# Patient Record
Sex: FEMALE | Race: WHITE | Employment: FULL TIME | URBAN - METROPOLITAN AREA
[De-identification: names, ages, dates, MRNs, and addresses within clinical notes are randomized per-mention and may not be internally consistent; named-entity substitution may affect disease eponyms.]

---

## 2021-03-30 ENCOUNTER — IMMUNIZATION (OUTPATIENT)
Dept: PRIMARY CARE CLINIC | Age: 51
End: 2021-03-30
Payer: COMMERCIAL

## 2021-03-30 PROCEDURE — 0001A PR IMM ADMN SARSCOV2 30MCG/0.3ML DIL RECON 1ST DOSE: CPT | Performed by: FAMILY MEDICINE

## 2021-03-30 PROCEDURE — 91300 COVID-19, PFIZER VACCINE 30MCG/0.3ML DOSE: CPT | Performed by: FAMILY MEDICINE

## 2021-04-20 ENCOUNTER — IMMUNIZATION (OUTPATIENT)
Dept: PRIMARY CARE CLINIC | Age: 51
End: 2021-04-20
Payer: COMMERCIAL

## 2021-04-20 PROCEDURE — 0002A COVID-19, PFIZER VACCINE 30MCG/0.3ML DOSE: CPT | Performed by: FAMILY MEDICINE

## 2021-04-20 PROCEDURE — 91300 COVID-19, PFIZER VACCINE 30MCG/0.3ML DOSE: CPT | Performed by: FAMILY MEDICINE

## 2022-03-28 ENCOUNTER — OFFICE VISIT (OUTPATIENT)
Dept: ORTHOPEDIC SURGERY | Age: 52
End: 2022-03-28
Payer: COMMERCIAL

## 2022-03-28 VITALS — HEIGHT: 68 IN | BODY MASS INDEX: 20.46 KG/M2 | WEIGHT: 135 LBS

## 2022-03-28 DIAGNOSIS — S80.02XA CONTUSION OF LEFT KNEE, INITIAL ENCOUNTER: ICD-10-CM

## 2022-03-28 DIAGNOSIS — M70.42 PREPATELLAR BURSITIS OF LEFT KNEE: ICD-10-CM

## 2022-03-28 DIAGNOSIS — M25.562 ACUTE PAIN OF LEFT KNEE: Primary | ICD-10-CM

## 2022-03-28 PROCEDURE — 99203 OFFICE O/P NEW LOW 30 MIN: CPT | Performed by: FAMILY MEDICINE

## 2022-03-28 PROCEDURE — L1812 KO ELASTIC W/JOINTS PRE OTS: HCPCS | Performed by: FAMILY MEDICINE

## 2022-03-28 RX ORDER — DICLOFENAC SODIUM 75 MG/1
75 TABLET, DELAYED RELEASE ORAL 2 TIMES DAILY
Qty: 60 TABLET | Refills: 3 | Status: SHIPPED | OUTPATIENT
Start: 2022-03-28

## 2022-03-28 NOTE — PROGRESS NOTES
Chief Complaint  Knee Pain (N L KNEE/ SHEY PARENT)      Initial consultation left knee pain status post fall 3/21/2022 while on vacation in [place]    History of Present Illness:  Adelaida Roche is a 49 y.o. female who is a very pleasant white female  for [employer] who is a very nice patient of Dr. Stephanie Arcehiga who is being seen today upon referral from Magno Tello at [facility] for evaluation of an acute injury to her left knee. She states that while in [place] on vacation at [employer] she was walking in a restaurant when another patron inadvertently extended her leg causing her to fall directly onto the anterior portion of her left greater than right knee. This was on a concrete tile surface and fell directly onto the anterior portion of her left knee. There was no pop or crack or sensation of shifting but she did have immediate pain. There is very focal swelling over what sounds like the prepatellar bursa almost immediately after the injury and then subsequently her whole knee swelled the following day. She did ice and took ibuprofen but did not go to the emergency room in [place]. She has been icing and has been having difficulty with positional changes going up and down stairs and is being very cautious. She really does not have a history of knee issues in the past, she has not had imaging. She has had a few episodes of subtle buckling but no active locking or catching. Her pain symptoms have improved about 40% with ice and relative rest as well as ibuprofen over the last week but did contact Lonza Ill at [facility] who asked that we see her today for orthopedic and sports consultation with initial imaging.       Pain Assessment  Location of Pain: Knee  Location Modifiers: Left  Severity of Pain: 2  Quality of Pain: Dull,Aching,Sharp  Duration of Pain: Persistent  Frequency of Pain: Constant  Aggravating Factors: Walking,Standing,Stairs,Squatting,Kneeling  Limiting Behavior: Yes  Relieving Factors: Rest  Result of Injury: Yes  Work-Related Injury: No  Are there other pain locations you wish to document?: No         Medical History     Patient's medications, allergies, past medical, surgical, social and family histories were reviewed and updated as appropriate. Review of Systems  Pertinent items are noted in HPI  Review of systems reviewed from Patient History Form dated on 3/28/2022 and available in the patient's chart under the Media tab. Vital Signs  There were no vitals filed for this visit. General Exam:     Constitutional: Patient is adequately groomed with no evidence of malnutrition  DTRs: Deep tendon reflexes are intact  Mental Status: The patient is oriented to time, place and person. The patient's mood and affect are appropriate. Lymphatic: The lymphatic examination bilaterally reveals all areas to be without enlargement or induration. Vascular: Examination reveals no swelling or calf tenderness. Peripheral pulses are palpable and 2+. Neurological: The patient has good coordination. There is no weakness or sensory deficit. Knee Examination  Inspection: There is no high-grade deformity although she does have some thickening and mild swelling over the prepatellar bursa as well as at best only trace knee joint effusion. Palpation: She does have tenderness over the medial and lateral patellofemoral facet and some tenderness over the residual swelling in her prepatellar bursa. Thankfully she does not exhibit much in the way of joint line tenderness. Rang of Motion: She is stiff and guarded in the terminal 10 degrees of extension and flexion is to about 110 today. Strength: Her extensor mechanism appears to be intact with strength being 4 out of 5 with flexion extension.     Special Tests: She does have some discomfort with direct palpation of the prepatellar bursa and some discomfort with patellar grind testing. Her Ward's testing instability testing appear to be relatively benign currently. She is mildly guarded. Skin: There are no rashes, ulcerations or lesions. Distal neurovascular exam is intact. Gait: Reasonable gait although she does have some anterior discomfort with positional change. Reflex symmetrically preserved    Additional Comments:     Additional Examinations:  Contralateral Exam: Examination of the right knee reveals intact skin. There is no focal tenderness. The patient demonstrates full painless range of motion with regards to flexion and extension. Strength is 5/5 thorough out all planes. Ligamentous stability is grossly intact. Right Lower Extremity: Examination of the right lower extremity does not show any tenderness, deformity or injury. Range of motion is unremarkable. There is no gross instability. There are no rashes, ulcerations or lesions. Strength and tone are normal.  Left Lower Extremity: Examination of the left lower extremity does not show any tenderness, deformity or injury. Range of motion is unremarkable. There is no gross instability. There are no rashes, ulcerations or lesions. Strength and tone are normal.      Diagnostic Test Findings: Left knee AP and PA weightbearing sunrise and lateral films were obtained today and does not show evidence of obvious displaced fracture or high-grade degenerative changes. Mild prepatellar swelling noted. Assessment : #1.   1 week status post fall with left anterior knee contusion with posttraumatic prepatellar bursitis and knee pain (partially improved)    Impression:  Encounter Diagnoses   Name Primary?     Acute pain of left knee Yes    Contusion of left knee, initial encounter     Prepatellar bursitis of left knee        Office Procedures:  Orders Placed This Encounter   Procedures    XR KNEE LEFT (MIN 4 VIEWS)     Standing Status:   Future     Number of Occurrences:   1     Standing Expiration Date:   3/28/2023     Order Specific Question:   Reason for exam:     Answer:   pain    Ambulatory referral to Physical Therapy     Referral Priority:   Routine     Referral Type:   Eval and Treat     Referral Reason:   Specialty Services Required     Requested Specialty:   Physical Therapy     Number of Visits Requested:   1    Breg Economy Hinged Knee WrapAround Brace     Patient was prescribed a Breg Economy Hinged Wrap Around Knee Brace. The left knee will require stabilization / immobilization from this semi-rigid / rigid orthosis to improve their function. The orthosis will assist in protecting the affected area, provide functional support and facilitate healing. The patient was educated and fit by a healthcare professional with expert knowledge and specialization in brace application while under the direct supervision of the treating physician. Verbal and written instructions for the use of and application of this item were provided. They were instructed to contact the office immediately should the brace result in increased pain, decreased sensation, increased swelling or worsening of the condition. Treatment Plan:  Treatment options were discussed with Myles Wood. We did review her current plain films and exam findings. She is now about 1 week out from her fall on 3/21/2022 in [place]. I suspect her dealing with a contusion with posttraumatic prepatellar bursitis but her plain films did not suggest high-grade degenerative changes. She could have some reactive chondromalacia patella as well. We did place her in a wraparound knee brace to give her some support we will start her in a short course of therapy possibly to include ultrasound from her prepatellar bursa. We started her on diclofenac 75 mg 1 pill twice daily we will see her back in a few weeks. We will consider imaging and/or injection should she remain symptomatic.   She will contact us in the interim with questions or concerns and icing and activity modification was discussed. This dictation was performed with a verbal recognition program (DRAGON) and it was checked for errors. It is possible that there are still dictated errors within this office note. If so, please bring any errors to my attention for an addendum. All efforts were made to ensure that this office note is accurate.

## 2022-04-01 ENCOUNTER — HOSPITAL ENCOUNTER (OUTPATIENT)
Dept: PHYSICAL THERAPY | Age: 52
Setting detail: THERAPIES SERIES
Discharge: HOME | End: 2022-04-01
Payer: COMMERCIAL

## 2022-04-01 PROCEDURE — 97140 MANUAL THERAPY 1/> REGIONS: CPT | Performed by: PHYSICAL THERAPIST

## 2022-04-01 PROCEDURE — 97110 THERAPEUTIC EXERCISES: CPT | Performed by: PHYSICAL THERAPIST

## 2022-04-01 PROCEDURE — 97161 PT EVAL LOW COMPLEX 20 MIN: CPT | Performed by: PHYSICAL THERAPIST

## 2022-04-01 NOTE — FLOWSHEET NOTE
[facility], [address] [place] [address]  Phone: [phone]  Fax [phone]    Physical Therapy Treatment Note/ Progress Report:           Date:  2022    Patient Name:  Minerva Nissen    :  1970  MRN: [id number]  Restrictions/Precautions:    Medical/Treatment Diagnosis Information:  Diagnosis: M25.562 (ICD-10-CM) - Acute pain of left knee; S80.02XA (ICD-10-CM) - Contusion of left knee, initial encounter; M70.42 (ICD-10-CM) - Prepatellar bursitis of left knee  Treatment Diagnosis: M25.562 (ICD-10-CM) - Acute pain of left knee; difficulty walking G85.7  Insurance/Certification information:  PT Insurance Information: [facility]  Physician Information:  Referring Practitioner: Dr. Sai Stewart  Has the plan of care been signed (Y/N):        []  Yes  [x]  No     Date of Patient follow up with Physician: 22      Is this a Progress Report:     []  Yes  [x]  No        If Yes:  Date Range for reporting period:  Beginning 22  Ending    Progress report will be due (10 Rx or 30 days whichever is less): 3/9/23       Recertification will be due (POC Duration  / 90 days whichever is less): 4-6 week POC  22     Visit # Insurance Allowable Auth Required   In-person 1 Check auth []  Yes []  No    Telehealth   []  Yes []  No    Total          Functional Scale: LEFS 48%    Date assessed:  22      Therapy Diagnosis/Practice Pattern:E      Number of Comorbidities:  [x]0     []1-2    []3+    Latex Allergy:  [x]NO      []YES  Preferred Language for Healthcare:   [x]English       []other:      Pain level:  2-4/10     SUBJECTIVE:  See eval    OBJECTIVE: See eval   Observation:    Test measurements:      RESTRICTIONS/PRECAUTIONS: None    Exercises/Interventions:     Therapeutic Ex (53374) Sets/sec Reps Notes/CUES         Gs belt stretch  30\"  3 x  HEP   HS long sitting stretch 30\"  3 x  HEP   Quad set into towel roll 5\" 10 x  HEP   Seated heel slide with belt 5\"  10 x  HEP   SLR  10 x  HEP; avoid QS each time due to pain    Prone tke  nv    Std Tke  nv                 Ultrasound 50%, 1.2 w/cm2  X 5 min           Manual Intervention (79626)      Patellar mobs  X 2 min  Superior / inf   PROM, manual GS/ HS stretch   X 4 min                             NMR re-education (98392)   CUES NEEDED                                                         Therapeutic Activity (41076)                                          Access Code: [email or web address]  URL: [email or web address]  Date: 04/01/2022  Prepared by: Annita Macdonald     Exercises  Long Sitting Calf Stretch with Strap - 2 x daily - 7 x weekly - 4 reps - 20 hold  Seated Table Hamstring Stretch - 2 x daily - 7 x weekly - 4 reps - 20 hold  Long Sitting Quad Set - 2 x daily - 7 x weekly - 10 reps - 5-10 hold  Sitting Heel Slide with Towel - 2 x daily - 7 x weekly - 10 reps - 5 hold  Small Range Straight Leg Raise - 2 x daily - 7 x weekly - 2 sets - 10 reps       Therapeutic Exercise and NMR EXR  [x] (51178) Provided verbal/tactile cueing for activities related to strengthening, flexibility, endurance, ROM for improvements in LE, proximal hip, and core control with self care, mobility, lifting, ambulation.  [] (47496) Provided verbal/tactile cueing for activities related to improving balance, coordination, kinesthetic sense, posture, motor skill, proprioception  to assist with LE, proximal hip, and core control in self care, mobility, lifting, ambulation and eccentric single leg control.      NMR and Therapeutic Activities:    [] (51384 or 75160) Provided verbal/tactile cueing for activities related to improving balance, coordination, kinesthetic sense, posture, motor skill, proprioception and motor activation to allow for proper function of core, proximal hip and LE with self care and ADLs  [] (56083) Gait Re-education- Provided training and instruction to the patient for proper LE, core and proximal hip recruitment and positioning and eccentric body weight control with ambulation re-education including up and down stairs     Home Exercise Program:    [x] (32987) Reviewed/Progressed HEP activities related to strengthening, flexibility, endurance, ROM of core, proximal hip and LE for functional self-care, mobility, lifting and ambulation/stair navigation   [] (40289)Reviewed/Progressed HEP activities related to improving balance, coordination, kinesthetic sense, posture, motor skill, proprioception of core, proximal hip and LE for self care, mobility, lifting, and ambulation/stair navigation      Manual Treatments:  PROM / STM / Oscillations-Mobs:  G-I, II, III, IV (PA's, Inf., Post.)  [x] (31117) Provided manual therapy to mobilize LE, proximal hip and/or LS spine soft tissue/joints for the purpose of modulating pain, promoting relaxation,  increasing ROM, reducing/eliminating soft tissue swelling/inflammation/restriction, improving soft tissue extensibility and allowing for proper ROM for normal function with self care, mobility, lifting and ambulation. Modalities: Declined - encouraged ice at home    [] GAME READY (VASO)- for significant edema, swelling, pain control. Charges  Timed Code Treatment Minutes: 25   Total Treatment Minutes: 40     [x] EVAL (LOW) 20121   [] EVAL (MOD) 20415  [] EVAL (HIGH) 95165   [] RE-EVAL     [x] WJ(59296) x 1   [] IONTO  [] NMR (53583) x     [] VASO  [x] Manual (53874) x 1     [] Other:  [] TA x      [] Mech Traction (12381)  [] ES(attended) (79720)      [] ES (un) (58668):       GOALS:   Patient stated goal: Return to ADLs  [] Progressing: [] Met: [] Not Met: [] Adjusted    Therapist goals for Patient:   Short Term Goals: To be achieved in: 2 weeks  1. Independent in HEP and progression per patient tolerance, in order to prevent re-injury. [] Progressing: [] Met: [] Not Met: [] Adjusted  2.  Patient will have a decrease in pain to facilitate improvement in movement, function, and ADLs as indicated by Functional Deficits. [] Progressing: [] Met: [] Not Met: [] Adjusted    Long Term Goals: To be achieved in: 4-6 weeks  1. Disability index score of 25% or less for the LEFS to assist with ambulating stairs and walking community. [] Progressing: [] Met: [] Not Met: [] Adjusted  2. Patient will demonstrate increased AROM to 0-140 deg to allow for proper joint functioning Necessary to perform squatting to  objection from floor. [] Progressing: [] Met: [] Not Met: [] Adjusted  3. Patient will demonstrate an increase in Strength to good proximal hip and quad strength and control, 5/5 MMT in LLE to allow for proper function to perform reciprocal gait on stairs. [] Progressing: [] Met: [] Not Met: [] Adjusted  4. Patient will return to ambulating reciprocal gait on 12 stairs necessary to manage her home without increased symptoms or restriction. [] Progressing: [] Met: [] Not Met: [] Adjusted  5. Patient will ambulate community distances with symmetrical gait pattern for 30 minutes to 1 hour. [] Progressing: [] Met: [] Not Met: [] Adjusted     Progression Towards Functional goals:  [] Patient is progressing as expected towards functional goals listed. [] Progression is slowed due to complexities listed. [] Progression has been slowed due to co-morbidities. [x] Plan just implemented, too soon to assess goals progression  [] Other:       Overall Progression Towards Functional goals/ Treatment Progress Update:  [] Patient is progressing as expected towards functional goals listed. [] Progression is slowed due to complexities/Impairments listed. [] Progression has been slowed due to co-morbidities.   [x] Plan just implemented, too soon to assess goals progression <30days   [] Goals require adjustment due to lack of progress  [] Patient is not progressing as expected and requires additional follow up with physician  [] Other    Prognosis for POC: [x] Good [] Fair  [] Poor      Patient requires continued skilled intervention: [x] Yes  [] No    Treatment/Activity Tolerance:  [x] Patient able to complete treatment  [] Patient limited by fatigue  [] Patient limited by pain    [] Patient limited by other medical complications  [] Other:     ASSESSMENT: See Eval        PLAN: See eval  [] Continue per plan of care [] Alter current plan (see comments above)  [x] Plan of care initiated [] Hold pending MD visit [] Discharge      Electronically signed by:  Lorie Maria PT    Note: If patient does not return for scheduled/ recommended follow up visits, this note will serve as a discharge from care along with most recent update on progress.

## 2022-04-01 NOTE — PLAN OF CARE
[facility], [address] [place] [address]  Phone: [phone]  Fax [phone]     Physical Therapy Certification    Dear Referring Practitioner: Dr. Nancy Mcduffie,    We had the pleasure of evaluating the following patient for physical therapy services at [address] [place] [address]. A summary of our findings can be found in the initial assessment below. This includes our plan of care. If you have any questions or concerns regarding these findings, please do not hesitate to contact me at the office phone number checked above.   Thank you for the referral.       Physician Signature:_______________________________Date:__________________  By signing above (or electronic signature), therapists plan is approved by physician    Patient: Lilliam Matthew   : 1970   MRN: [id number]  Referring Physician: Referring Practitioner: Dr. Nancy Mcduffie      Evaluation Date: 2022      Medical Diagnosis Information:  Diagnosis: M25.562 (ICD-10-CM) - Acute pain of left knee; S80.02XA (ICD-10-CM) - Contusion of left knee, initial encounter; M70.42 (ICD-10-CM) - Prepatellar bursitis of left knee   Treatment Diagnosis: M25.562 (ICD-10-CM) - Acute pain of left knee; difficulty walking R26.2                                         Insurance information: PT Insurance Information: BCBS - check Auth       Precautions/ Contra-indications:      C-SSRS Triggered by Intake questionnaire (Past 2 wk assessment):   [x] No, Questionnaire did not trigger screening.   [] Yes, Patient intake triggered further evaluation      [] C-SSRS Screening completed  [] PCP notified via Plan of Care  [] Emergency services notified     Latex Allergy:  [x]NO      []YES  Preferred Language for Healthcare:   [x]English       []other:    SUBJECTIVE: Patient stated complaint:  Patient reports on vacation in [place] when lady stuck foot out at table and tripped over her foot and landed directly on left knee. Difficulty walking but did not get AD or seek treatment while on vacation. Swelling immediately. Upon return home followed up with MD. Lanie Chambers in brace which believes has helped with stability. Scared it is going to buckle. No previous history of knee pathology. Difficulty managing stairs at home, using step to gait. Difficulty bending the knee. No popping, clicking and catching. Pain isolated to anterior knee. Prescribed anti-inflammatory for swelling control. Has not been icing but did initially first week. No issues with sleep. No issues with desk work. Minimal issues with walking as long as stays straight forward avoiding pivoting and guarded. Relevant Medical History: Unremarkable  Functional Disability Index: LEFS 42/80= 48%    Pain Scale: 2-4/10  Easing factors: activity modification, brace, guarded movement/ walking   Provocative factors: squatting, stairs, kneeling    Type: []Constant   [x]Intermittent  []Radiating []Localized []other:     Numbness/Tingling: Denies    Occupation/School:   - desk job    Living Status/Prior Level of Function: Independent with ADLs and IADLs, walking    OBJECTIVE:     ROM LEFT RIGHT   Knee ext Lacking 10 0   Knee Flex 110 140   Strength  LEFT RIGHT   HIP Flexors 4+ 4+   HIP Abductors     HIP Ext     Hip ER     Knee EXT (quad) 4+ 5   Knee Flex (HS) 4+ 5   Circumference  Mid apex  Superior pole     34.5 cm  35.5   33.5cm  35     Reflexes/Sensation: Not formally assessed   []Dermatomes/Myotomes intact    []Reflexes equal and normal bilaterally   []Other:    Joint mobility: Patellar mobility    []Normal    [x]Hypo   []Hyper    Palpation: ttp prepatellar bursa, lateral patellar border    Functional Mobility/Transfers: Independent     Posture:  Moderate anterior patella effusion     Bandages/Dressings/Incisions: NA    Gait: (include devices/WB status) Minimal gait deviations, guarded minimally with knee flexion and TKE.  Right ER     Orthopedic Special Tests: None performed this date                       [x] Patient history, allergies, meds reviewed. Medical chart reviewed. See intake form. Review Of Systems (ROS):  [x]Performed Review of systems (Integumentary, CardioPulmonary, Neurological) by intake and observation. Intake form has been scanned into medical record. Patient has been instructed to contact their primary care physician regarding ROS issues if not already being addressed at this time. Co-morbidities/Complexities (which will affect course of rehabilitation):   [x]None           Arthritic conditions   []Rheumatoid arthritis (M05.9)  []Osteoarthritis (M19.91)   Cardiovascular conditions   []Hypertension (I10)  []Hyperlipidemia (E78.5)  []Angina pectoris (I20)  []Atherosclerosis (I70)   Musculoskeletal conditions   []Disc pathology   []Congenital spine pathologies   []Prior surgical intervention  []Osteoporosis (M81.8)  []Osteopenia (M85.8)   Endocrine conditions   []Hypothyroid (E03.9)  []Hyperthyroid Gastrointestinal conditions   []Constipation (A04.63)   Metabolic conditions   []Morbid obesity (E66.01)  []Diabetes type 1(E10.65) or 2 (E11.65)   []Neuropathy (G60.9)     Pulmonary conditions   []Asthma (J45)  []Coughing   []COPD (J44.9)   Psychological Disorders  []Anxiety (F41.9)  []Depression (F32.9)   []Other:   []Other:          Barriers to/and or personal factors that will affect rehab potential:              []Age  []Sex              []Motivation/Lack of Motivation                        []Co-Morbidities              []Cognitive Function, education/learning barriers              []Environmental, home barriers              []profession/work barriers  []past PT/medical experience  []other:  Justification: Patient should benefit well from therapy with minimal to no barriers to rehab     Falls Risk Assessment (30 days):   [x] Falls Risk assessed and no intervention required.   [] Falls Risk assessed and Patient requires intervention due to being higher risk   TUG score (>12s at risk):     [] Falls education provided, including       ASSESSMENT:   Functional Impairments:     [x]Noted lumbar/proximal hip/LE joint hypomobility   [x]Decreased LE functional ROM   [x]Decreased core/proximal hip strength and neuromuscular control   [x]Decreased LE functional strength   [x]Reduced balance/proprioceptive control   []other:      Functional Activity Limitations (from functional questionnaire and intake)   [x]Reduced ability to tolerate prolonged functional positions   [x]Reduced ability or difficulty with changes of positions or transfers between positions   []Reduced ability to maintain good posture and demonstrate good body mechanics with sitting, bending, and lifting   []Reduced ability to sleep   [] Reduced ability or tolerance with driving and/or computer work   [x]Reduced ability to perform lifting, carrying tasks   [x]Reduced ability to squat   []Reduced ability to forward bend   [x]Reduced ability to ambulate prolonged functional periods/distances/surfaces   [x]Reduced ability to ascend/descend stairs   [x]Reduced ability to run, hop, cut or jump   []other:    Participation Restrictions   [x]Reduced participation in self care activities   [x]Reduced participation in home management activities   []Reduced participation in work activities   [x]Reduced participation in social activities. []Reduced participation in sport/recreation activities. Classification :    []Signs/symptoms consistent with post-surgical status including decreased ROM, strength and function.    []Signs/symptoms consistent with joint sprain/strain   [x]Signs/symptoms consistent with patella-femoral syndrome   []Signs/symptoms consistent with knee OA/hip OA   []Signs/symptoms consistent with internal derangement of knee/Hip   []Signs/symptoms consistent with functional hip weakness/NMR control      []Signs/symptoms consistent with tendinitis/tendinosis    []signs/symptoms consistent with pathology which may benefit from Dry needling      [x]other: s/s consistent with bursitis and contusion     Prognosis/Rehab Potential:      []Excellent   [x]Good    []Fair   []Poor    Tolerance of evaluation/treatment:    []Excellent   [x]Good    []Fair   []Poor  Physical Therapy Evaluation Complexity Justification  [x] A history of present problem with:  [x] no personal factors and/or comorbidities that impact the plan of care;  []1-2 personal factors and/or comorbidities that impact the plan of care  []3 personal factors and/or comorbidities that impact the plan of care  [x] An examination of body systems using standardized tests and measures addressing any of the following: body structures and functions (impairments), activity limitations, and/or participation restrictions;:  [] a total of 1-2 or more elements   [x] a total of 3 or more elements   [] a total of 4 or more elements   [x] A clinical presentation with:  [x] stable and/or uncomplicated characteristics   [] evolving clinical presentation with changing characteristics  [] unstable and unpredictable characteristics;   [x] Clinical decision making of [x] low, [] moderate, [] high complexity using standardized patient assessment instrument and/or measurable assessment of functional outcome. [x] EVAL (LOW) 98553 (typically 20 minutes face-to-face)  [] EVAL (MOD) 89143 (typically 30 minutes face-to-face)  [] EVAL (HIGH) 06823 (typically 45 minutes face-to-face)  [] RE-EVAL       PLAN:   Frequency/Duration:  1-2 days per week for 6-8 Weeks:  Interventions:  [x]  Therapeutic exercise including: strength training, ROM, for Lower extremity and core   [x]  NMR activation and proprioception for LE, Glutes and Core   [x]  Manual therapy as indicated for LE, Hip and spine to include: Dry Needling/IASTM, STM, PROM, Gr I-IV mobilizations, manipulation.    [x] Modalities as needed that may include: thermal agents, E-stim, Biofeedback, US, iontophoresis as indicated  [x] Patient education on joint protection, postural re-education, activity modification, progression of HEP. HEP instruction:   Access Code: [email or web address]  URL: [email or web address]  Date: 04/01/2022  Prepared by: Sarkis Horn    Exercises  Long Sitting Calf Stretch with Strap - 2 x daily - 7 x weekly - 4 reps - 20 hold  Seated Table Hamstring Stretch - 2 x daily - 7 x weekly - 4 reps - 20 hold  Long Sitting Quad Set - 2 x daily - 7 x weekly - 10 reps - 5-10 hold  Sitting Heel Slide with Towel - 2 x daily - 7 x weekly - 10 reps - 5 hold  Small Range Straight Leg Raise - 2 x daily - 7 x weekly - 2 sets - 10 reps    GOALS:  Patient stated goal: Return to ADLs  [] Progressing: [] Met: [] Not Met: [] Adjusted    Therapist goals for Patient:   Short Term Goals: To be achieved in: 2 weeks  1. Independent in HEP and progression per patient tolerance, in order to prevent re-injury. [] Progressing: [] Met: [] Not Met: [] Adjusted  2. Patient will have a decrease in pain to facilitate improvement in movement, function, and ADLs as indicated by Functional Deficits. [] Progressing: [] Met: [] Not Met: [] Adjusted    Long Term Goals: To be achieved in: 4-6 weeks  1. Disability index score of 25% or less for the LEFS to assist with ambulating stairs and walking community. [] Progressing: [] Met: [] Not Met: [] Adjusted  2. Patient will demonstrate increased AROM to 0-140 deg to allow for proper joint functioning Necessary to perform squatting to  objection from floor. [] Progressing: [] Met: [] Not Met: [] Adjusted  3. Patient will demonstrate an increase in Strength to good proximal hip and quad strength and control, 5/5 MMT in LLE to allow for proper function to perform reciprocal gait on stairs. [] Progressing: [] Met: [] Not Met: [] Adjusted  4.  Patient will return to ambulating reciprocal gait on 12 stairs necessary to manage her home without increased symptoms or restriction. [] Progressing: [] Met: [] Not Met: [] Adjusted  5. Patient will ambulate community distances with symmetrical gait pattern for 30 minutes to 1 hour.    [] Progressing: [] Met: [] Not Met: [] Adjusted     Electronically signed by:  Pam Londone 429

## 2022-04-07 ENCOUNTER — HOSPITAL ENCOUNTER (OUTPATIENT)
Dept: PHYSICAL THERAPY | Age: 52
Setting detail: THERAPIES SERIES
Discharge: HOME | End: 2022-04-07
Payer: COMMERCIAL

## 2022-04-07 PROCEDURE — 97140 MANUAL THERAPY 1/> REGIONS: CPT | Performed by: PHYSICAL THERAPIST

## 2022-04-07 PROCEDURE — 97110 THERAPEUTIC EXERCISES: CPT | Performed by: PHYSICAL THERAPIST

## 2022-04-07 PROCEDURE — 97035 APP MDLTY 1+ULTRASOUND EA 15: CPT | Performed by: PHYSICAL THERAPIST

## 2022-04-07 NOTE — FLOWSHEET NOTE
Gs belt stretch  HEP   HS long sitting stretch HEP   Quad set into towel roll 5\" 15 x  HEP   SAQ 5\" 15 x     Seated heel slide with belt HEP   SLR  2 x 10  HEP; avoid QS each time due to pain    Hip Add iso 5\"  15 x     Prone tke 5\" 20 x     Std Tke GTB 20 x 5\"                 Ultrasound 50%, 1.2 w/cm2  X 8 min     Laser 4.0 j/cm2  4 spots     Manual Intervention (34224)      Patellar mobs  X 2 min  Superior / inf   PROM, manual GS/ HS stretch   X 4 min     Prone quad stretch   Painful                       NMR re-education (52428)   CUES NEEDED                                                         Therapeutic Activity (09174)                                          Access Code: [email or web address]  URL: [email or web address]  Date: 04/01/2022  Prepared by: Wero Keene     Exercises  Long Sitting Calf Stretch with Strap - 2 x daily - 7 x weekly - 4 reps - 20 hold  Seated Table Hamstring Stretch - 2 x daily - 7 x weekly - 4 reps - 20 hold  Long Sitting Quad Set - 2 x daily - 7 x weekly - 10 reps - 5-10 hold  Sitting Heel Slide with Towel - 2 x daily - 7 x weekly - 10 reps - 5 hold  Small Range Straight Leg Raise - 2 x daily - 7 x weekly - 2 sets - 10 reps       Therapeutic Exercise and NMR EXR  [x] (00593) Provided verbal/tactile cueing for activities related to strengthening, flexibility, endurance, ROM for improvements in LE, proximal hip, and core control with self care, mobility, lifting, ambulation.  [] (88641) Provided verbal/tactile cueing for activities related to improving balance, coordination, kinesthetic sense, posture, motor skill, proprioception  to assist with LE, proximal hip, and core control in self care, mobility, lifting, ambulation and eccentric single leg control.      NMR and Therapeutic Activities:    [] (39896 or 63902) Provided verbal/tactile cueing for activities related to improving balance, coordination, kinesthetic sense, posture, motor skill, proprioception and motor activation to allow for proper function of core, proximal hip and LE with self care and ADLs  [] (19767) Gait Re-education- Provided training and instruction to the patient for proper LE, core and proximal hip recruitment and positioning and eccentric body weight control with ambulation re-education including up and down stairs     Home Exercise Program:    [x] (92028) Reviewed/Progressed HEP activities related to strengthening, flexibility, endurance, ROM of core, proximal hip and LE for functional self-care, mobility, lifting and ambulation/stair navigation   [] (91709)Reviewed/Progressed HEP activities related to improving balance, coordination, kinesthetic sense, posture, motor skill, proprioception of core, proximal hip and LE for self care, mobility, lifting, and ambulation/stair navigation      Manual Treatments:  PROM / STM / Oscillations-Mobs:  G-I, II, III, IV (PA's, Inf., Post.)  [x] (14587) Provided manual therapy to mobilize LE, proximal hip and/or LS spine soft tissue/joints for the purpose of modulating pain, promoting relaxation,  increasing ROM, reducing/eliminating soft tissue swelling/inflammation/restriction, improving soft tissue extensibility and allowing for proper ROM for normal function with self care, mobility, lifting and ambulation. Modalities: Declined - encouraged ice at home    [] GAME READY (VASO)- for significant edema, swelling, pain control. Charges  Timed Code Treatment Minutes: 40   Total Treatment Minutes: 40     [] EVAL (LOW) 58564   [] EVAL (MOD) 84368   [] EVAL (HIGH) 94631   [] RE-EVAL     [x] ET(00798) x 1   [] IONTO  [] NMR (93272) x     [] VASO  [x] Manual (20298) x 1     [x] Other: Ultrasound  [] TA x      [] Mech Traction (75317)  [] ES(attended) (82749)      [] ES (un) (24744):       GOALS:   Patient stated goal: Return to ADLs  [] Progressing: [] Met: [] Not Met: [] Adjusted    Therapist goals for Patient:   Short Term Goals: To be achieved in: 2 weeks  1.  Independent in HEP and progression per patient tolerance, in order to prevent re-injury. [] Progressing: [] Met: [] Not Met: [] Adjusted  2. Patient will have a decrease in pain to facilitate improvement in movement, function, and ADLs as indicated by Functional Deficits. [] Progressing: [] Met: [] Not Met: [] Adjusted    Long Term Goals: To be achieved in: 4-6 weeks  1. Disability index score of 25% or less for the LEFS to assist with ambulating stairs and walking community. [] Progressing: [] Met: [] Not Met: [] Adjusted  2. Patient will demonstrate increased AROM to 0-140 deg to allow for proper joint functioning Necessary to perform squatting to  objection from floor. [] Progressing: [] Met: [] Not Met: [] Adjusted  3. Patient will demonstrate an increase in Strength to good proximal hip and quad strength and control, 5/5 MMT in LLE to allow for proper function to perform reciprocal gait on stairs. [] Progressing: [] Met: [] Not Met: [] Adjusted  4. Patient will return to ambulating reciprocal gait on 12 stairs necessary to manage her home without increased symptoms or restriction. [] Progressing: [] Met: [] Not Met: [] Adjusted  5. Patient will ambulate community distances with symmetrical gait pattern for 30 minutes to 1 hour. [] Progressing: [] Met: [] Not Met: [] Adjusted     Progression Towards Functional goals:  [] Patient is progressing as expected towards functional goals listed. [] Progression is slowed due to complexities listed. [] Progression has been slowed due to co-morbidities. [x] Plan just implemented, too soon to assess goals progression  [] Other:       Overall Progression Towards Functional goals/ Treatment Progress Update:  [] Patient is progressing as expected towards functional goals listed. [] Progression is slowed due to complexities/Impairments listed. [] Progression has been slowed due to co-morbidities.   [x] Plan just implemented, too soon to assess goals progression <30days   [] Goals require adjustment due to lack of progress  [] Patient is not progressing as expected and requires additional follow up with physician  [] Other    Prognosis for POC: [x] Good [] Fair  [] Poor      Patient requires continued skilled intervention: [x] Yes  [] No    Treatment/Activity Tolerance:  [x] Patient able to complete treatment  [] Patient limited by fatigue  [] Patient limited by pain    [] Patient limited by other medical complications  [] Other:     ASSESSMENT: Ambulating well. Tolerated most exercises well and demonstrating good ROM and improved quad activation. Pain with prone quad stretch. Attempted kinesiotape to help with swelling control along with Laser. PLAN: See eval  [x] Continue per plan of care [] Alter current plan (see comments above)  [] Plan of care initiated [] Hold pending MD visit [] Discharge      Electronically signed by:  Gabriela Abbasi PT    Note: If patient does not return for scheduled/ recommended follow up visits, this note will serve as a discharge from care along with most recent update on progress.

## 2022-04-11 ENCOUNTER — HOSPITAL ENCOUNTER (OUTPATIENT)
Dept: PHYSICAL THERAPY | Age: 52
Setting detail: THERAPIES SERIES
Discharge: HOME | End: 2022-04-11
Payer: COMMERCIAL

## 2022-04-11 PROCEDURE — 97140 MANUAL THERAPY 1/> REGIONS: CPT | Performed by: PHYSICAL THERAPIST

## 2022-04-11 PROCEDURE — 97110 THERAPEUTIC EXERCISES: CPT | Performed by: PHYSICAL THERAPIST

## 2022-04-11 PROCEDURE — 97035 APP MDLTY 1+ULTRASOUND EA 15: CPT | Performed by: PHYSICAL THERAPIST

## 2022-04-11 NOTE — FLOWSHEET NOTE
[facility], [address] [place] [address]  Phone: [phone]  Fax [phone]    Physical Therapy Treatment Note/ Progress Report:           Date:  2022    Patient Name:  Darrin Simental    :  1970  MRN: [id number]  Restrictions/Precautions:    Medical/Treatment Diagnosis Information:  Diagnosis: M25.562 (ICD-10-CM) - Acute pain of left knee; S80.02XA (ICD-10-CM) - Contusion of left knee, initial encounter; M70.42 (ICD-10-CM) - Prepatellar bursitis of left knee  Treatment Diagnosis: M25.562 (ICD-10-CM) - Acute pain of left knee; difficulty walking F97.0  Insurance/Certification information:  PT Insurance Information: [facility]  Physician Information:  Referring Practitioner: Dr. Max Fonseca  Has the plan of care been signed (Y/N):        []  Yes  [x]  No     Date of Patient follow up with Physician: 22      Is this a Progress Report:     []  Yes  [x]  No        If Yes:  Date Range for reporting period:  Beginning 22  Ending    Progress report will be due (10 Rx or 30 days whichever is less): 94       Recertification will be due (POC Duration  / 90 days whichever is less): 4-6 week POC  22     Visit # Insurance Allowable Auth Required   In-person 3 Check auth []  Yes []  No    Telehealth   []  Yes []  No    Total          Functional Scale: LEFS 48%    Date assessed:  22      Therapy Diagnosis/Practice Pattern:E      Number of Comorbidities:  [x]0     []1-2    []3+    Latex Allergy:  [x]NO      []YES  Preferred Language for Healthcare:   [x]English       []other:      Pain level:  2-4/10     SUBJECTIVE:  Slight improvement with tenderness. Unable to tolerate wearing jeans and sitting down due to pressure. Discomfort with descending stairs.       OBJECTIVE: See eval   Observation:    Test measurements:      RESTRICTIONS/PRECAUTIONS: None    Exercises/Interventions:     Therapeutic Ex (94214) Sets/sec Reps Notes/CUES   Bike  X 5 min     Gs belt stretch  HEP   HS long sitting stretch HEP   Quad set into towel roll HEP   SAQ 5\" 15 x     Seated heel slide with belt HEP   SLR  2 x 10  HEP; avoid QS each time due to pain    Hip Add iso 5\"  15 x     Supine hip flex stretch 20\"  3 x     Prone tke    Leg Press bilat/ ecc 80#/ 60# 20 x ea    Std Tke 60# 20 x 5\"     HR/ TR  15 x     LSD 4 in 15 x     Stair management   X 2 laps     Ultrasound 50%, 1.2 w/cm2  X 8 min     Laser 4.0 j/cm2  4 spots     Manual Intervention (25408)      Patellar mobs  X 2 min  Superior / inf   PROM, manual GS/ HS stretch   X 4 min     Prone quad stretch   Painful                       NMR re-education (27808)   CUES NEEDED   Retro glider  15 x                                                     Therapeutic Activity (41554)                                          Access Code: [email or web address]  URL: [email or web address]  Date: 04/01/2022  Prepared by: Mendel Newport     Exercises  Long Sitting Calf Stretch with Strap - 2 x daily - 7 x weekly - 4 reps - 20 hold  Seated Table Hamstring Stretch - 2 x daily - 7 x weekly - 4 reps - 20 hold  Long Sitting Quad Set - 2 x daily - 7 x weekly - 10 reps - 5-10 hold  Sitting Heel Slide with Towel - 2 x daily - 7 x weekly - 10 reps - 5 hold  Small Range Straight Leg Raise - 2 x daily - 7 x weekly - 2 sets - 10 reps       Therapeutic Exercise and NMR EXR  [x] (12579) Provided verbal/tactile cueing for activities related to strengthening, flexibility, endurance, ROM for improvements in LE, proximal hip, and core control with self care, mobility, lifting, ambulation.  [] (05175) Provided verbal/tactile cueing for activities related to improving balance, coordination, kinesthetic sense, posture, motor skill, proprioception  to assist with LE, proximal hip, and core control in self care, mobility, lifting, ambulation and eccentric single leg control.      NMR and Therapeutic Activities:    [] (56457 or 71602) Provided verbal/tactile cueing for activities related to improving balance, coordination, kinesthetic sense, posture, motor skill, proprioception and motor activation to allow for proper function of core, proximal hip and LE with self care and ADLs  [x] (66729) Gait Re-education- Provided training and instruction to the patient for proper LE, core and proximal hip recruitment and positioning and eccentric body weight control with ambulation re-education including up and down stairs     Home Exercise Program:    [x] (44689) Reviewed/Progressed HEP activities related to strengthening, flexibility, endurance, ROM of core, proximal hip and LE for functional self-care, mobility, lifting and ambulation/stair navigation   [] (53923)Reviewed/Progressed HEP activities related to improving balance, coordination, kinesthetic sense, posture, motor skill, proprioception of core, proximal hip and LE for self care, mobility, lifting, and ambulation/stair navigation      Manual Treatments:  PROM / STM / Oscillations-Mobs:  G-I, II, III, IV (PA's, Inf., Post.)  [x] (99320) Provided manual therapy to mobilize LE, proximal hip and/or LS spine soft tissue/joints for the purpose of modulating pain, promoting relaxation,  increasing ROM, reducing/eliminating soft tissue swelling/inflammation/restriction, improving soft tissue extensibility and allowing for proper ROM for normal function with self care, mobility, lifting and ambulation. Modalities: Declined - encouraged ice at home    [] GAME READY (VASO)- for significant edema, swelling, pain control.      Charges  Timed Code Treatment Minutes: 40   Total Treatment Minutes: 40     [] EVAL (LOW) 51623   [] EVAL (MOD) 66215   [] EVAL (HIGH) 73869   [] RE-EVAL     [x] AB(78558) x 1   [] IONTO  [] NMR (87118) x     [] VASO  [x] Manual (77865) x 1     [x] Other: Ultrasound  [] TA x      [] Mech Traction (12637)  [] ES(attended) (17549)      [] ES (un) (12809):       GOALS:   Patient stated goal: Return to ADLs  [] Progressing: [] Met: [] Not Met: [] Adjusted    Therapist goals for Patient:   Short Term Goals: To be achieved in: 2 weeks  1. Independent in HEP and progression per patient tolerance, in order to prevent re-injury. [] Progressing: [] Met: [] Not Met: [] Adjusted  2. Patient will have a decrease in pain to facilitate improvement in movement, function, and ADLs as indicated by Functional Deficits. [] Progressing: [] Met: [] Not Met: [] Adjusted    Long Term Goals: To be achieved in: 4-6 weeks  1. Disability index score of 25% or less for the LEFS to assist with ambulating stairs and walking community. [] Progressing: [] Met: [] Not Met: [] Adjusted  2. Patient will demonstrate increased AROM to 0-140 deg to allow for proper joint functioning Necessary to perform squatting to  objection from floor. [] Progressing: [] Met: [] Not Met: [] Adjusted  3. Patient will demonstrate an increase in Strength to good proximal hip and quad strength and control, 5/5 MMT in LLE to allow for proper function to perform reciprocal gait on stairs. [] Progressing: [] Met: [] Not Met: [] Adjusted  4. Patient will return to ambulating reciprocal gait on 12 stairs necessary to manage her home without increased symptoms or restriction. [] Progressing: [] Met: [] Not Met: [] Adjusted  5. Patient will ambulate community distances with symmetrical gait pattern for 30 minutes to 1 hour. [] Progressing: [] Met: [] Not Met: [] Adjusted     Progression Towards Functional goals:  [] Patient is progressing as expected towards functional goals listed. [] Progression is slowed due to complexities listed. [] Progression has been slowed due to co-morbidities. [x] Plan just implemented, too soon to assess goals progression  [] Other:       Overall Progression Towards Functional goals/ Treatment Progress Update:  [] Patient is progressing as expected towards functional goals listed.     [] Progression is slowed due to complexities/Impairments listed. [] Progression has been slowed due to co-morbidities. [x] Plan just implemented, too soon to assess goals progression <30days   [] Goals require adjustment due to lack of progress  [] Patient is not progressing as expected and requires additional follow up with physician  [] Other    Prognosis for POC: [x] Good [] Fair  [] Poor      Patient requires continued skilled intervention: [x] Yes  [] No    Treatment/Activity Tolerance:  [x] Patient able to complete treatment  [] Patient limited by fatigue  [] Patient limited by pain    [] Patient limited by other medical complications  [] Other:     ASSESSMENT: Unable to tolerate prone quad stretch. Pain with last moment with step down off step. No pain with eccentric lowering exercises in PT. Continue to work on quad strengthening and eccentric. Performed patellar stabilization kinesio tape. PLAN: See eval  [x] Continue per plan of care [] Alter current plan (see comments above)  [] Plan of care initiated [] Hold pending MD visit [] Discharge      Electronically signed by:  Anatoly Hughes, PT    Note: If patient does not return for scheduled/ recommended follow up visits, this note will serve as a discharge from care along with most recent update on progress.

## 2022-04-13 ENCOUNTER — HOSPITAL ENCOUNTER (OUTPATIENT)
Dept: PHYSICAL THERAPY | Age: 52
Setting detail: THERAPIES SERIES
Discharge: HOME | End: 2022-04-13
Payer: COMMERCIAL

## 2022-04-13 PROCEDURE — 97110 THERAPEUTIC EXERCISES: CPT | Performed by: PHYSICAL THERAPIST

## 2022-04-13 PROCEDURE — 97035 APP MDLTY 1+ULTRASOUND EA 15: CPT | Performed by: PHYSICAL THERAPIST

## 2022-04-13 PROCEDURE — 97140 MANUAL THERAPY 1/> REGIONS: CPT | Performed by: PHYSICAL THERAPIST

## 2022-04-13 NOTE — FLOWSHEET NOTE
[facility], [address] [place] [address]  Phone: [phone]  Fax [phone]    Physical Therapy Treatment Note/ Progress Report:           Date:  2022    Patient Name:  Barry Diaz    :  1970  MRN: [id number]  Restrictions/Precautions:    Medical/Treatment Diagnosis Information:  Diagnosis: M25.562 (ICD-10-CM) - Acute pain of left knee; S80.02XA (ICD-10-CM) - Contusion of left knee, initial encounter; M70.42 (ICD-10-CM) - Prepatellar bursitis of left knee  Treatment Diagnosis: M25.562 (ICD-10-CM) - Acute pain of left knee; difficulty walking D55.5  Insurance/Certification information:  PT Insurance Information: [address] [place] [address]  Physician Information:  Referring Practitioner: Dr. Watkins Mt  Has the plan of care been signed (Y/N):        []  Yes  [x]  No     Date of Patient follow up with Physician: 22      Is this a Progress Report:     []  Yes  [x]  No        If Yes:  Date Range for reporting period:  Beginning 22  Ending    Progress report will be due (10 Rx or 30 days whichever is less): 7/3/67       Recertification will be due (POC Duration  / 90 days whichever is less): 4-6 week POC  22     Visit # Insurance Allowable Auth Required   In-person 4 30 []  Yes [x]  No    Telehealth   []  Yes []  No    Total          Functional Scale: LEFS 48%    Date assessed:  22            LEFS   5%    Date assessed:  22    Therapy Diagnosis/Practice Pattern:E      Number of Comorbidities:  [x]0     []1-2    []3+    Latex Allergy:  [x]NO      []YES  Preferred Language for Healthcare:   [x]English       []other:      Pain level:  0 /10     SUBJECTIVE:  Pt has infreq bouts of pain. She is better able to do stairs. She is frustrated by persistent swelling. Pt is no longer taking oral anti inflammatory. She is sleeping well.      OBJECTIVE: See eval   Observation:    Test measurements:    ROM LEFT current   Knee ext Lacking 10 0   Knee Flex 110 150   Strength  LEFT    HIP Flexors 4+    Knee EXT (quad) 4+    Knee Flex (HS) 4+    Circumference  Mid apex  Superior pole       34.5 cm  35.5      RESTRICTIONS/PRECAUTIONS: None    Exercises/Interventions:     Therapeutic Ex (49203) Sets/sec Reps Notes/CUES   Bike  X 5 min     Gs belt stretch  HEP   HS long sitting stretch HEP   Quad set into towel roll HEP   SAQ 5\" 15 x     Seated heel slide with belt HEP   SLR  2 x 10  HEP; avoid QS each time due to pain    Hip Add iso 5\"  15 x     Supine hip flex stretch 20\"  3 x     Prone tke    Leg Press bilat/ ecc 80#/ 60# 20 x ea    Std Tke 60# 20 x 5\"     HR/ TR  15 x     LSD 4 in 20 x     Stair management   X 2 laps     Ultrasound 50%, 1.2 w/cm2  X 8 min     Laser 4.0 j/cm2  4 spots     Manual Intervention (27726)      Patellar mobs  X 2 min  Superior / inf   PROM, manual GS/ HS stretch   X 4 min     Prone quad stretch   Painful                       NMR re-education (01009)   CUES NEEDED   Retro glider  15 x                                                     Therapeutic Activity (36835)                                          Access Code: [email or web address]  URL: [email or web address]  Date: 04/01/2022  Prepared by: Nydia Javed     Exercises  Long Sitting Calf Stretch with Strap - 2 x daily - 7 x weekly - 4 reps - 20 hold  Seated Table Hamstring Stretch - 2 x daily - 7 x weekly - 4 reps - 20 hold  Long Sitting Quad Set - 2 x daily - 7 x weekly - 10 reps - 5-10 hold  Sitting Heel Slide with Towel - 2 x daily - 7 x weekly - 10 reps - 5 hold  Small Range Straight Leg Raise - 2 x daily - 7 x weekly - 2 sets - 10 reps       Therapeutic Exercise and NMR EXR  [x] (76730) Provided verbal/tactile cueing for activities related to strengthening, flexibility, endurance, ROM for improvements in LE, proximal hip, and core control with self care, mobility, lifting, ambulation.  [] (97056) Provided verbal/tactile cueing for activities related to improving balance, coordination, kinesthetic sense, posture, motor skill, proprioception  to assist with LE, proximal hip, and core control in self care, mobility, lifting, ambulation and eccentric single leg control. NMR and Therapeutic Activities:    [] (62621 or 87394) Provided verbal/tactile cueing for activities related to improving balance, coordination, kinesthetic sense, posture, motor skill, proprioception and motor activation to allow for proper function of core, proximal hip and LE with self care and ADLs  [x] (55090) Gait Re-education- Provided training and instruction to the patient for proper LE, core and proximal hip recruitment and positioning and eccentric body weight control with ambulation re-education including up and down stairs     Home Exercise Program:    [x] (09391) Reviewed/Progressed HEP activities related to strengthening, flexibility, endurance, ROM of core, proximal hip and LE for functional self-care, mobility, lifting and ambulation/stair navigation   [] (14200)Reviewed/Progressed HEP activities related to improving balance, coordination, kinesthetic sense, posture, motor skill, proprioception of core, proximal hip and LE for self care, mobility, lifting, and ambulation/stair navigation      Manual Treatments:  PROM / STM / Oscillations-Mobs:  G-I, II, III, IV (PA's, Inf., Post.)  [x] (09832) Provided manual therapy to mobilize LE, proximal hip and/or LS spine soft tissue/joints for the purpose of modulating pain, promoting relaxation,  increasing ROM, reducing/eliminating soft tissue swelling/inflammation/restriction, improving soft tissue extensibility and allowing for proper ROM for normal function with self care, mobility, lifting and ambulation. Modalities: Declined - encouraged ice at home    [] GAME READY (VASO)- for significant edema, swelling, pain control.      Charges  Timed Code Treatment Minutes: 40   Total Treatment Minutes: 40     [] EVAL (LOW) 33660 [] EVAL (MOD) 84248   [] EVAL (HIGH) 18505   [] RE-EVAL     [x] LM(10394) x 1   [] IONTO  [] NMR (82799) x     [] VASO  [x] Manual (52666) x 1     [x] Other: Ultrasound  [] TA x      [] Mech Traction (63561)  [] ES(attended) (66159)      [] ES (un) (47820):       GOALS:   Patient stated goal: Return to ADLs  [] Progressing: [] Met: [] Not Met: [] Adjusted    Therapist goals for Patient:   Short Term Goals: To be achieved in: 2 weeks  1. Independent in HEP and progression per patient tolerance, in order to prevent re-injury. [x] Progressing: [] Met: [] Not Met: [] Adjusted  2. Patient will have a decrease in pain to facilitate improvement in movement, function, and ADLs as indicated by Functional Deficits. [x] Progressing: [] Met: [] Not Met: [] Adjusted    Long Term Goals: To be achieved in: 4-6 weeks  1. Disability index score of 25% or less for the LEFS to assist with ambulating stairs and walking community. [] Progressing: [x] Met: [] Not Met: [] Adjusted  2. Patient will demonstrate increased AROM to 0-140 deg to allow for proper joint functioning Necessary to perform squatting to  objection from floor. [] Progressing: [x] Met: [] Not Met: [] Adjusted  3. Patient will demonstrate an increase in Strength to good proximal hip and quad strength and control, 5/5 MMT in LLE to allow for proper function to perform reciprocal gait on stairs. [] Progressing: [x] Met: [] Not Met: [] Adjusted  4. Patient will return to ambulating reciprocal gait on 12 stairs necessary to manage her home without increased symptoms or restriction. [x] Progressing: [] Met: [] Not Met: [] Adjusted  5. Patient will ambulate community distances with symmetrical gait pattern for 30 minutes to 1 hour. [x] Progressing: [] Met: [] Not Met: [] Adjusted     Progression Towards Functional goals:  [x] Patient is progressing as expected towards functional goals listed. [] Progression is slowed due to complexities listed.   [] Progression has been slowed due to co-morbidities. [] Plan just implemented, too soon to assess goals progression  [] Other:       Overall Progression Towards Functional goals/ Treatment Progress Update:  [x] Patient is progressing as expected towards functional goals listed. [] Progression is slowed due to complexities/Impairments listed. [] Progression has been slowed due to co-morbidities. [] Plan just implemented, too soon to assess goals progression <30days   [] Goals require adjustment due to lack of progress  [] Patient is not progressing as expected and requires additional follow up with physician  [] Other    Prognosis for POC: [x] Good [] Fair  [] Poor      Patient requires continued skilled intervention: [x] Yes  [] No    Treatment/Activity Tolerance:  [x] Patient able to complete treatment  [] Patient limited by fatigue  [] Patient limited by pain    [] Patient limited by other medical complications  [] Other:     ASSESSMENT: Pt is almost ready for DC. Pt may benefit from iontophoresis. Will see Doc Monday. PLAN: See eval  [x] Continue per plan of care [] Alter current plan (see comments above)  [] Plan of care initiated [] Hold pending MD visit [] Discharge      Electronically signed by:  Nahed Chowdhury PT    Note: If patient does not return for scheduled/ recommended follow up visits, this note will serve as a discharge from care along with most recent update on progress.

## 2022-04-18 ENCOUNTER — OFFICE VISIT (OUTPATIENT)
Dept: ORTHOPEDIC SURGERY | Age: 52
End: 2022-04-18
Payer: COMMERCIAL

## 2022-04-18 ENCOUNTER — HOSPITAL ENCOUNTER (OUTPATIENT)
Dept: PHYSICAL THERAPY | Age: 52
Setting detail: THERAPIES SERIES
Discharge: HOME | End: 2022-04-18
Payer: COMMERCIAL

## 2022-04-18 VITALS — WEIGHT: 135 LBS | HEIGHT: 68 IN | BODY MASS INDEX: 20.46 KG/M2

## 2022-04-18 DIAGNOSIS — M70.42 PREPATELLAR BURSITIS OF LEFT KNEE: ICD-10-CM

## 2022-04-18 DIAGNOSIS — S80.02XA CONTUSION OF LEFT KNEE, INITIAL ENCOUNTER: ICD-10-CM

## 2022-04-18 DIAGNOSIS — M25.562 ACUTE PAIN OF LEFT KNEE: Primary | ICD-10-CM

## 2022-04-18 PROCEDURE — 20610 DRAIN/INJ JOINT/BURSA W/O US: CPT | Performed by: FAMILY MEDICINE

## 2022-04-18 RX ORDER — BETAMETHASONE SODIUM PHOSPHATE AND BETAMETHASONE ACETATE 3; 3 MG/ML; MG/ML
12 INJECTION, SUSPENSION INTRA-ARTICULAR; INTRALESIONAL; INTRAMUSCULAR; SOFT TISSUE ONCE
Status: COMPLETED | OUTPATIENT
Start: 2022-04-18 | End: 2022-04-18

## 2022-04-18 RX ORDER — BUPIVACAINE HYDROCHLORIDE 2.5 MG/ML
2 INJECTION, SOLUTION INFILTRATION; PERINEURAL ONCE
Status: COMPLETED | OUTPATIENT
Start: 2022-04-18 | End: 2022-04-18

## 2022-04-18 RX ORDER — LIDOCAINE HYDROCHLORIDE 10 MG/ML
1 INJECTION, SOLUTION INFILTRATION; PERINEURAL ONCE
Status: COMPLETED | OUTPATIENT
Start: 2022-04-18 | End: 2022-04-18

## 2022-04-18 RX ADMIN — BUPIVACAINE HYDROCHLORIDE 5 MG: 2.5 INJECTION, SOLUTION INFILTRATION; PERINEURAL at 09:47

## 2022-04-18 RX ADMIN — BETAMETHASONE SODIUM PHOSPHATE AND BETAMETHASONE ACETATE 12 MG: 3; 3 INJECTION, SUSPENSION INTRA-ARTICULAR; INTRALESIONAL; INTRAMUSCULAR; SOFT TISSUE at 09:46

## 2022-04-18 RX ADMIN — LIDOCAINE HYDROCHLORIDE 1 ML: 10 INJECTION, SOLUTION INFILTRATION; PERINEURAL at 09:48

## 2022-04-18 NOTE — FLOWSHEET NOTE
[facility],  [address]        Physical Therapy  Cancellation/No-show Note  Patient Name:  Maria Antonia Ferris  :  1970   Date:  2022  Cancelled visits to date: 1  No-shows to date: 0    For today's appointment patient:  [x]  Cancelled  []  Rescheduled appointment  []  No-show     Reason given by patient:  []  Patient ill  []  Conflicting appointment  []  No transportation    []  Conflict with work  []  No reason given  []  Other:     Comments:  Pt received injection today.     Electronically signed by:  Marivel Howell PT

## 2022-04-18 NOTE — PROGRESS NOTES
Chief Complaint  Knee Pain (CK LEFT KNEE)      FUleft knee pain status post fall 3/21/2022 with left knee contusion with residual prepatellar bursitis and reactive patellofemoral compression syndrome    History of Present Illness:  Pietro Baker is a 49 y.o. female who is a very pleasant white female  for [facility] who is a very nice patient of Dr. Boy Jensen who is being seen today upon referral from Sudhir Shelby at [facility] for evaluation of an acute injury to her left knee. She states that while in [place] on vacation at [employer] she was walking in a restaurant when another patron inadvertently extended her leg causing her to fall directly onto the anterior portion of her left greater than right knee. This was on a concrete tile surface and fell directly onto the anterior portion of her left knee. There was no pop or crack or sensation of shifting but she did have immediate pain. There is very focal swelling over what sounds like the prepatellar bursa almost immediately after the injury and then subsequently her whole knee swelled the following day. She did ice and took ibuprofen but did not go to the emergency room in [place]. She has been icing and has been having difficulty with positional changes going up and down stairs and is being very cautious. She really does not have a history of knee issues in the past, she has not had imaging. She has had a few episodes of subtle buckling but no active locking or catching. Her pain symptoms have improved about 40% with ice and relative rest as well as ibuprofen over the last week but did contact Merissa Jackson at [facility] who asked that we see her today for orthopedic and sports consultation with initial imaging. We last saw [facility] in the office on 3/28/2022 and is now 4 weeks out from her significant left knee contusion with reactive chondromalacia patella with prepatellar bursitis.   She has been attending physical therapy with 4 sessions thus far and has improved at least 80%. She has not been consistent with utilization of her anti-inflammatories but mobility and strength are improving. She still has discomfort with going downstairs with pain about 3-5 out of 10 as well as full flexion but mobility and strength are improving. She has been very good about performing her home-based exercise program and is having less in the way of pseudo buckling. She has tolerated her diclofenac but really has not been taking this with any consistency as she did not believe she needed this. No active locking or catching. He is pleased with her initial progress. Medical History     Patient's medications, allergies, past medical, surgical, social and family histories were reviewed and updated as appropriate. Review of Systems  Pertinent items are noted in HPI  Review of systems reviewed from Patient History Form dated on 3/28/2022 and available in the patient's chart under the Media tab. Vital Signs  There were no vitals filed for this visit. General Exam:     Constitutional: Patient is adequately groomed with no evidence of malnutrition  DTRs: Deep tendon reflexes are intact  Mental Status: The patient is oriented to time, place and person. The patient's mood and affect are appropriate. Lymphatic: The lymphatic examination bilaterally reveals all areas to be without enlargement or induration. Vascular: Examination reveals no swelling or calf tenderness. Peripheral pulses are palpable and 2+. Neurological: The patient has good coordination. There is no weakness or sensory deficit. Knee Examination  Inspection: There is no high-grade deformity although she does have some thickening and mild swelling over the prepatellar bursa as well as at best only trace knee joint effusion.     Palpation: She does have residual but improved tenderness over the medial and lateral patellofemoral facet and some tenderness over the residual swelling in her prepatellar bursa. Thankfully she does not exhibit much in the way of joint line tenderness. Rang of Motion: She has regained her full extension and flexion is to about 30 today. Strength: Her extensor mechanism appears to be intact improving strength at 4+ out of 5 with flexion extension. Special Tests: She does have less prominent discomfort with direct palpation of the prepatellar bursa and some discomfort with patellar grind testing which she rates at about a 6-7 on 10. Her Ward's testing instability testing appear to be relatively benign currently. She is mildly guarded. Skin: There are no rashes, ulcerations or lesions. Distal neurovascular exam is intact. Gait: Reasonable gait although she does have some residual anterior discomfort with positional change. Reflex symmetrically preserved    Additional Comments:     Additional Examinations:  Contralateral Exam: Examination of the right knee reveals intact skin. There is no focal tenderness. The patient demonstrates full painless range of motion with regards to flexion and extension. Strength is 5/5 thorough out all planes. Ligamentous stability is grossly intact. Right Lower Extremity: Examination of the right lower extremity does not show any tenderness, deformity or injury. Range of motion is unremarkable. There is no gross instability. There are no rashes, ulcerations or lesions. Strength and tone are normal.  Left Lower Extremity: Examination of the left lower extremity does not show any tenderness, deformity or injury. Range of motion is unremarkable. There is no gross instability. There are no rashes, ulcerations or lesions. Strength and tone are normal.      Diagnostic Test Findings: Left knee AP and PA weightbearing sunrise and lateral films were reviewed from 3/28/2022 and does not show evidence of obvious displaced fracture or high-grade degenerative changes.   Mild prepatellar swelling noted. Assessment : #1.  4 weeks status post fall with improving left anterior knee contusion with posttraumatic prepatellar bursitis and reactive chondromalacia patella     Impression:  Encounter Diagnoses   Name Primary?  Acute pain of left knee Yes    Contusion of left knee, initial encounter     Prepatellar bursitis of left knee        Office Procedures:  Orders Placed This Encounter   Procedures    RI ARTHROCENTESIS ASPIR&/INJ MAJOR JT/BURSA W/O US       Treatment Plan:  Treatment options were discussed with Stefan Yates. We did once again review her current plain films and exam findings. She is now about 4 weeks out from her fall on 3/21/2022 in [place]. I suspect her dealing with an improving case of left knee contusion with posttraumatic prepatellar bursitis and chondromalacia patella. She still has some retropatellar discomfort with positional changes and stair climbing as well as full flexion. After discussing options, despite her 80% improvement, we did perform an intra-articular steroid injection to her left knee using 2 cc of Celestone, 2 cc of Marcaine, 1 cc of Xylocaine. She will continue with her knee brace and physical therapy. I encouraged her to start back on her diclofenac 75 mg 1 pill twice daily for the next several weeks consistently to help with her residual soft tissue swelling and a patellar bursitis symptoms which are improving. The importance of continuing with her exercise program was discussed as well as icing and activity modifications. We will see her back in 3 to 4 weeks and she has residual symptoms. We will consider imaging should this occur. She will contact us in interim with questions or concerns. This dictation was performed with a verbal recognition program (DRAGON) and it was checked for errors. It is possible that there are still dictated errors within this office note. If so, please bring any errors to my attention for an addendum. All efforts were made to ensure that this office note is accurate.

## 2022-04-21 ENCOUNTER — HOSPITAL ENCOUNTER (OUTPATIENT)
Dept: PHYSICAL THERAPY | Age: 52
Setting detail: THERAPIES SERIES
Discharge: HOME | End: 2022-04-21
Payer: COMMERCIAL

## 2022-04-21 PROCEDURE — 97035 APP MDLTY 1+ULTRASOUND EA 15: CPT | Performed by: PHYSICAL THERAPIST

## 2022-04-21 PROCEDURE — 97110 THERAPEUTIC EXERCISES: CPT | Performed by: PHYSICAL THERAPIST

## 2022-04-21 PROCEDURE — 97140 MANUAL THERAPY 1/> REGIONS: CPT | Performed by: PHYSICAL THERAPIST

## 2022-04-21 NOTE — FLOWSHEET NOTE
[facility], [address]  Phone: [phone]  Fax [phone]    Physical Therapy Treatment Note/ Progress Report:           Date:  2022    Patient Name:  Tom Wolf    :  1970  MRN: [id number]  Restrictions/Precautions:    Medical/Treatment Diagnosis Information:  Diagnosis: M25.562 (ICD-10-CM) - Acute pain of left knee; S80.02XA (ICD-10-CM) - Contusion of left knee, initial encounter; M70.42 (ICD-10-CM) - Prepatellar bursitis of left knee  Treatment Diagnosis: M25.562 (ICD-10-CM) - Acute pain of left knee; difficulty walking K10.8  Insurance/Certification information:  PT Insurance Information: [address] [place] [address]  Physician Information:  Referring Practitioner: Dr. Trice Souza  Has the plan of care been signed (Y/N):        []  Yes  [x]  No     Date of Patient follow up with Physician: 22      Is this a Progress Report:     []  Yes  [x]  No        If Yes:  Date Range for reporting period:  Beginning 22  Ending    Progress report will be due (10 Rx or 30 days whichever is less): 76       Recertification will be due (POC Duration  / 90 days whichever is less): 4-6 week POC  22     Visit # Insurance Allowable Auth Required   In-person 5 30 []  Yes [x]  No    Telehealth   []  Yes []  No    Total          Functional Scale: LEFS 48%    Date assessed:  22            LEFS   5%    Date assessed:  22    Therapy Diagnosis/Practice Pattern:E      Number of Comorbidities:  [x]0     []1-2    []3+    Latex Allergy:  [x]NO      []YES  Preferred Language for Healthcare:   [x]English       []other:      Pain level:  0 /10     SUBJECTIVE:  Pt saw the Doc. He gave her a cortisone injection due to the lingering swelling. He said he wanted a couple more PT visits. Pt is unable to crouch/ fully squat on the knee. Pt feels unable to kneel. Overall, she feels better.   Able to walk, ascend/descend stairs, and sleep without restriction. OBJECTIVE: See eval   Observation:    Test measurements:    ROM LEFT current   Knee ext Lacking 10 0   Knee Flex 110 155   Strength  LEFT    HIP Flexors 4+    Knee EXT (quad) 4+    Knee Flex (HS) 4+    Circumference  Mid apex  Superior pole       34.5 cm  35.5      RESTRICTIONS/PRECAUTIONS: None    Exercises/Interventions:     Therapeutic Ex (94849) Sets/sec Reps Notes/CUES   Bike  X 5 min     Gs belt stretch /  Seated HS s HEP   Quad set into towel roll HEP   SAQ 2# 3 x 10     SLR 2# 2 x 10  HEP   SL hip abd 2# 2 x 10     Supine hip flex stretch 20\"  3 x     Prone tke 5\" 20 x     Leg Press bilat/ ecc 100#/ 60# 20 x ea    Std Tke 60# 20 x 5\"     HR/ TR  15 x     LSD 4 in 20 x        Ultrasound 50%, 1.2 w/cm2  X 8 min     Laser 4.0 j/cm2  4 spots     Manual Intervention (46131)      Patellar mobs  X 2 min  Superior / inf   PROM, manual GS/ HS stretch   X 4 min     Prone quad stretch  2 min  Pt get heel to glut. Pt does have some patellar discomfort. NMR re-education (17128)   CUES NEEDED   Retro glider  15 x                                                     Therapeutic Activity (50785)                                          Access Code: [email or web address]  URL: [email or web address]  Date: 04/01/2022  Prepared by: Irma Covington     Exercises  Long Sitting Calf Stretch with Strap - 2 x daily - 7 x weekly - 4 reps - 20 hold  Seated Table Hamstring Stretch - 2 x daily - 7 x weekly - 4 reps - 20 hold  Long Sitting Quad Set - 2 x daily - 7 x weekly - 10 reps - 5-10 hold  Sitting Heel Slide with Towel - 2 x daily - 7 x weekly - 10 reps - 5 hold  Small Range Straight Leg Raise - 2 x daily - 7 x weekly - 2 sets - 10 reps       Therapeutic Exercise and NMR EXR  [x] (40792) Provided verbal/tactile cueing for activities related to strengthening, flexibility, endurance, ROM for improvements in LE, proximal hip, and core control with self care, mobility, lifting, ambulation.  [] (13210) Provided verbal/tactile cueing for activities related to improving balance, coordination, kinesthetic sense, posture, motor skill, proprioception  to assist with LE, proximal hip, and core control in self care, mobility, lifting, ambulation and eccentric single leg control. NMR and Therapeutic Activities:    [] (84969 or 78347) Provided verbal/tactile cueing for activities related to improving balance, coordination, kinesthetic sense, posture, motor skill, proprioception and motor activation to allow for proper function of core, proximal hip and LE with self care and ADLs  [x] (75107) Gait Re-education- Provided training and instruction to the patient for proper LE, core and proximal hip recruitment and positioning and eccentric body weight control with ambulation re-education including up and down stairs     Home Exercise Program:    [x] (20813) Reviewed/Progressed HEP activities related to strengthening, flexibility, endurance, ROM of core, proximal hip and LE for functional self-care, mobility, lifting and ambulation/stair navigation   [] (29004)Reviewed/Progressed HEP activities related to improving balance, coordination, kinesthetic sense, posture, motor skill, proprioception of core, proximal hip and LE for self care, mobility, lifting, and ambulation/stair navigation      Manual Treatments:  PROM / STM / Oscillations-Mobs:  G-I, II, III, IV (PA's, Inf., Post.)  [x] (14617) Provided manual therapy to mobilize LE, proximal hip and/or LS spine soft tissue/joints for the purpose of modulating pain, promoting relaxation,  increasing ROM, reducing/eliminating soft tissue swelling/inflammation/restriction, improving soft tissue extensibility and allowing for proper ROM for normal function with self care, mobility, lifting and ambulation. Modalities: Declined - encouraged ice at home    [] GAME READY (VASO)- for significant edema, swelling, pain control.      Charges  Timed Code Treatment Minutes: 40   Total Treatment Minutes: 50     [] EVAL (LOW) 64182   [] EVAL (MOD) 97973   [] EVAL (HIGH) 72839   [] RE-EVAL     [x] DJ(91872) x 1   [] IONTO  [] NMR (20492) x     [] VASO  [x] Manual (34803) x 1     [x] Other: Ultrasound/ ice  [] TA x      [] Mech Traction (47733)  [] ES(attended) (49555)      [] ES (un) (88695):       GOALS:   Patient stated goal: Return to ADLs  [] Progressing: [] Met: [] Not Met: [] Adjusted    Therapist goals for Patient:   Short Term Goals: To be achieved in: 2 weeks  1. Independent in HEP and progression per patient tolerance, in order to prevent re-injury. [x] Progressing: [] Met: [] Not Met: [] Adjusted  2. Patient will have a decrease in pain to facilitate improvement in movement, function, and ADLs as indicated by Functional Deficits. [x] Progressing: [] Met: [] Not Met: [] Adjusted    Long Term Goals: To be achieved in: 4-6 weeks  1. Disability index score of 25% or less for the LEFS to assist with ambulating stairs and walking community. [] Progressing: [x] Met: [] Not Met: [] Adjusted  2. Patient will demonstrate increased AROM to 0-140 deg to allow for proper joint functioning Necessary to perform squatting to  objection from floor. [] Progressing: [x] Met: [] Not Met: [] Adjusted  3. Patient will demonstrate an increase in Strength to good proximal hip and quad strength and control, 5/5 MMT in LLE to allow for proper function to perform reciprocal gait on stairs. [] Progressing: [x] Met: [] Not Met: [] Adjusted  4. Patient will return to ambulating reciprocal gait on 12 stairs necessary to manage her home without increased symptoms or restriction. [x] Progressing: [] Met: [] Not Met: [] Adjusted  5. Patient will ambulate community distances with symmetrical gait pattern for 30 minutes to 1 hour.    [x] Progressing: [] Met: [] Not Met: [] Adjusted     Progression Towards Functional goals:  [x] Patient is progressing as expected towards functional goals listed. [] Progression is slowed due to complexities listed. [] Progression has been slowed due to co-morbidities. [] Plan just implemented, too soon to assess goals progression  [] Other:       Overall Progression Towards Functional goals/ Treatment Progress Update:  [x] Patient is progressing as expected towards functional goals listed. [] Progression is slowed due to complexities/Impairments listed. [] Progression has been slowed due to co-morbidities. [] Plan just implemented, too soon to assess goals progression <30days   [] Goals require adjustment due to lack of progress  [] Patient is not progressing as expected and requires additional follow up with physician  [] Other    Prognosis for POC: [x] Good [] Fair  [] Poor      Patient requires continued skilled intervention: [x] Yes  [] No    Treatment/Activity Tolerance:  [x] Patient able to complete treatment  [] Patient limited by fatigue  [] Patient limited by pain    [] Patient limited by other medical complications  [] Other:     ASSESSMENT: Pt's ROM is normal.  Pt has improving quad tone. Pt encouraged to keep up with HEP and ice frequently even though she has no pain. Ice to control swelling. Pt to set follow up in two weeks. Pt does have contact info to reach us if she has any setbacks. PLAN: See eval  [x] Continue per plan of care [] Alter current plan (see comments above)  [] Plan of care initiated [] Hold pending MD visit [] Discharge      Electronically signed by:  Elayne Ulrich PT    Note: If patient does not return for scheduled/ recommended follow up visits, this note will serve as a discharge from care along with most recent update on progress.

## 2022-05-03 ENCOUNTER — HOSPITAL ENCOUNTER (OUTPATIENT)
Dept: PHYSICAL THERAPY | Age: 52
Setting detail: THERAPIES SERIES
Discharge: HOME | End: 2022-05-03

## 2022-05-03 NOTE — FLOWSHEET NOTE
[facility],  [address], [address] [place] [address]        Physical Therapy  Cancellation/No-show Note  Patient Name:  Harlan Gold  :  1970   Date:  5/3/2022  Cancelled visits to date: 2  No-shows to date: 0    For today's appointment patient:  [x]  Cancelled  []  Rescheduled appointment  []  No-show     Reason given by patient:  []  Patient ill  []  Conflicting appointment  []  No transportation    []  Conflict with work  [x]  No reason given  []  Other:     Comments:      Electronically signed by:  Tasia Vaca, PT